# Patient Record
Sex: MALE | Race: WHITE | Employment: FULL TIME | URBAN - METROPOLITAN AREA
[De-identification: names, ages, dates, MRNs, and addresses within clinical notes are randomized per-mention and may not be internally consistent; named-entity substitution may affect disease eponyms.]

---

## 2020-12-20 PROBLEM — R73.01 IMPAIRED FASTING GLUCOSE: Status: ACTIVE | Noted: 2020-12-20

## 2020-12-20 PROBLEM — E66.9 OBESITY (BMI 30.0-34.9): Status: ACTIVE | Noted: 2020-12-20

## 2022-06-22 ENCOUNTER — GENETICS ENCOUNTER (OUTPATIENT)
Dept: GENETICS | Age: 42
End: 2022-06-22
Attending: GENETIC COUNSELOR, MS
Payer: COMMERCIAL

## 2022-06-22 ENCOUNTER — NURSE ONLY (OUTPATIENT)
Dept: HEMATOLOGY/ONCOLOGY | Age: 42
End: 2022-06-22
Attending: GENETIC COUNSELOR, MS
Payer: COMMERCIAL

## 2022-06-22 DIAGNOSIS — Z80.3 FAMILY HISTORY OF BREAST CANCER IN FIRST DEGREE RELATIVE: ICD-10-CM

## 2022-06-22 DIAGNOSIS — Z80.0 FAMILY HISTORY OF PANCREATIC CANCER: Primary | ICD-10-CM

## 2022-06-22 PROCEDURE — 36415 COLL VENOUS BLD VENIPUNCTURE: CPT

## 2022-06-22 PROCEDURE — 96040 HC GENETIC COUNSELING EA 30 MIN: CPT | Performed by: GENETIC COUNSELOR, MS

## 2022-07-01 ENCOUNTER — GENETICS ENCOUNTER (OUTPATIENT)
Dept: HEMATOLOGY/ONCOLOGY | Facility: HOSPITAL | Age: 42
End: 2022-07-01

## 2022-07-01 NOTE — PROGRESS NOTES
Referring Provider:                    Smith Billings MD    Reason for Referral:  Ashu Callahan had genetic testing performed on 6/22/22 because of a family history of breast and pancreatic cancer in his mother. Genetic Testing Result:  No known pathogenic variants were found in 47 genes including: APC, KAREN, AXIN2, BARD1, BMPR1A, BRCA1, BRCA2, BRIP1, CDH1, CDK4, CDKN2A, CHEK2, CTNNA1, DICER1, GREM1 (promoter region deletion/duplication testing only), HOXB13 (sequencing only), KIT, MEN1, MLH1, MSH2 (including EPCAM rearrangement testing), MSH3, MSH6, MUTYH, NBN, NF1, NTHL1 (sequencing only), PALB2, PDGRFA, PMS2, POLD1, POLE, PTEN, RAD50, RAD51C, RAD51D, SDHA (sequencing only), SDHB, SDHC, SDHD, SMAD4, SMARCA4, STK11, TP53, TSC1, TSC2, and VHL. Please refer to the report from [facility] for additional testing information. These results were discussed with Mr. Rissa Day by phone on 7/1/22. Summary and Plan:  These results indicate that it is unlikely that Mr. Rissa Day has a pathogenic variant in any of the genes listed above. The limitations of the testing include the chance that a pathogenic variant in a gene other than those included in this analysis might be the cause of cancer in Mr. Darcy Anderson relatives. I encouraged Mr. Rissa Day to contact me on an annual basis to see if there have been any updates in genetic testing that would apply to him or to inform me if there are any changes to the family history. In the meantime, Mr. Rissa Day and his relatives should speak with their physicians to discuss recommended medical management according to their personal and family history. Specifically, Mr. Darcy Anderson maternal uncle was diagnosed with colorectal cancer at age 28 and he should discuss the pros and cons of starting screening colonoscopy now, at age 42, based on this family history.      Cc:  Ashu Callahan